# Patient Record
Sex: FEMALE | Race: WHITE | HISPANIC OR LATINO
[De-identification: names, ages, dates, MRNs, and addresses within clinical notes are randomized per-mention and may not be internally consistent; named-entity substitution may affect disease eponyms.]

---

## 2021-04-06 PROBLEM — Z00.00 ENCOUNTER FOR PREVENTIVE HEALTH EXAMINATION: Status: ACTIVE | Noted: 2021-04-06

## 2021-04-12 ENCOUNTER — TRANSCRIPTION ENCOUNTER (OUTPATIENT)
Age: 51
End: 2021-04-12

## 2021-04-12 ENCOUNTER — APPOINTMENT (OUTPATIENT)
Dept: COLORECTAL SURGERY | Facility: CLINIC | Age: 51
End: 2021-04-12
Payer: COMMERCIAL

## 2021-04-12 ENCOUNTER — INPATIENT (INPATIENT)
Facility: HOSPITAL | Age: 51
LOS: 1 days | Discharge: HOME | DRG: 355 | End: 2021-04-14
Attending: SURGERY | Admitting: SURGERY
Payer: COMMERCIAL

## 2021-04-12 VITALS
OXYGEN SATURATION: 100 % | WEIGHT: 162.92 LBS | SYSTOLIC BLOOD PRESSURE: 141 MMHG | TEMPERATURE: 98 F | DIASTOLIC BLOOD PRESSURE: 94 MMHG | RESPIRATION RATE: 18 BRPM | HEART RATE: 85 BPM | HEIGHT: 63 IN

## 2021-04-12 VITALS
WEIGHT: 163 LBS | BODY MASS INDEX: 28.88 KG/M2 | DIASTOLIC BLOOD PRESSURE: 84 MMHG | SYSTOLIC BLOOD PRESSURE: 133 MMHG | HEIGHT: 63 IN | HEART RATE: 79 BPM | TEMPERATURE: 98.2 F

## 2021-04-12 DIAGNOSIS — K42.0 UMBILICAL HERNIA WITH OBSTRUCTION, WITHOUT GANGRENE: ICD-10-CM

## 2021-04-12 DIAGNOSIS — Z80.0 FAMILY HISTORY OF MALIGNANT NEOPLASM OF DIGESTIVE ORGANS: ICD-10-CM

## 2021-04-12 DIAGNOSIS — R10.9 UNSPECIFIED ABDOMINAL PAIN: ICD-10-CM

## 2021-04-12 DIAGNOSIS — Z87.891 PERSONAL HISTORY OF NICOTINE DEPENDENCE: ICD-10-CM

## 2021-04-12 DIAGNOSIS — Z98.890 OTHER SPECIFIED POSTPROCEDURAL STATES: Chronic | ICD-10-CM

## 2021-04-12 DIAGNOSIS — Z82.49 FAMILY HISTORY OF ISCHEMIC HEART DISEASE AND OTHER DISEASES OF THE CIRCULATORY SYSTEM: ICD-10-CM

## 2021-04-12 DIAGNOSIS — N83.209 UNSPECIFIED OVARIAN CYST, UNSPECIFIED SIDE: ICD-10-CM

## 2021-04-12 DIAGNOSIS — Z72.89 OTHER PROBLEMS RELATED TO LIFESTYLE: ICD-10-CM

## 2021-04-12 DIAGNOSIS — Z80.3 FAMILY HISTORY OF MALIGNANT NEOPLASM OF BREAST: ICD-10-CM

## 2021-04-12 DIAGNOSIS — J45.909 UNSPECIFIED ASTHMA, UNCOMPLICATED: ICD-10-CM

## 2021-04-12 DIAGNOSIS — Z86.69 PERSONAL HISTORY OF OTHER DISEASES OF THE NERVOUS SYSTEM AND SENSE ORGANS: ICD-10-CM

## 2021-04-12 DIAGNOSIS — Z72.3 LACK OF PHYSICAL EXERCISE: ICD-10-CM

## 2021-04-12 DIAGNOSIS — S32.009A UNSPECIFIED FRACTURE OF UNSPECIFIED LUMBAR VERTEBRA, INITIAL ENCOUNTER FOR CLOSED FRACTURE: ICD-10-CM

## 2021-04-12 LAB
ALBUMIN SERPL ELPH-MCNC: 4 G/DL — SIGNIFICANT CHANGE UP (ref 3.3–5)
ALBUMIN SERPL ELPH-MCNC: 4.2 G/DL — SIGNIFICANT CHANGE UP (ref 3.3–5)
ALP SERPL-CCNC: 80 U/L — SIGNIFICANT CHANGE UP (ref 40–120)
ALP SERPL-CCNC: 82 U/L — SIGNIFICANT CHANGE UP (ref 40–120)
ALT FLD-CCNC: 12 U/L — SIGNIFICANT CHANGE UP (ref 10–45)
ALT FLD-CCNC: SIGNIFICANT CHANGE UP U/L (ref 10–45)
ANION GAP SERPL CALC-SCNC: 14 MMOL/L — SIGNIFICANT CHANGE UP (ref 5–17)
ANION GAP SERPL CALC-SCNC: 9 MMOL/L — SIGNIFICANT CHANGE UP (ref 5–17)
APPEARANCE UR: CLEAR — SIGNIFICANT CHANGE UP
APTT BLD: 33.3 SEC — SIGNIFICANT CHANGE UP (ref 27.5–35.5)
AST SERPL-CCNC: 20 U/L — SIGNIFICANT CHANGE UP (ref 10–40)
AST SERPL-CCNC: SIGNIFICANT CHANGE UP U/L (ref 10–40)
BASOPHILS # BLD AUTO: 0.08 K/UL — SIGNIFICANT CHANGE UP (ref 0–0.2)
BASOPHILS NFR BLD AUTO: 1.1 % — SIGNIFICANT CHANGE UP (ref 0–2)
BILIRUB SERPL-MCNC: 0.3 MG/DL — SIGNIFICANT CHANGE UP (ref 0.2–1.2)
BILIRUB SERPL-MCNC: 0.3 MG/DL — SIGNIFICANT CHANGE UP (ref 0.2–1.2)
BILIRUB UR-MCNC: NEGATIVE — SIGNIFICANT CHANGE UP
BLD GP AB SCN SERPL QL: NEGATIVE — SIGNIFICANT CHANGE UP
BUN SERPL-MCNC: 6 MG/DL — LOW (ref 7–23)
BUN SERPL-MCNC: 7 MG/DL — SIGNIFICANT CHANGE UP (ref 7–23)
CALCIUM SERPL-MCNC: 9.2 MG/DL — SIGNIFICANT CHANGE UP (ref 8.4–10.5)
CALCIUM SERPL-MCNC: 9.5 MG/DL — SIGNIFICANT CHANGE UP (ref 8.4–10.5)
CHLORIDE SERPL-SCNC: 105 MMOL/L — SIGNIFICANT CHANGE UP (ref 96–108)
CHLORIDE SERPL-SCNC: 111 MMOL/L — HIGH (ref 96–108)
CO2 SERPL-SCNC: 20 MMOL/L — LOW (ref 22–31)
CO2 SERPL-SCNC: 24 MMOL/L — SIGNIFICANT CHANGE UP (ref 22–31)
COLOR SPEC: YELLOW — SIGNIFICANT CHANGE UP
CREAT SERPL-MCNC: 0.68 MG/DL — SIGNIFICANT CHANGE UP (ref 0.5–1.3)
CREAT SERPL-MCNC: 0.7 MG/DL — SIGNIFICANT CHANGE UP (ref 0.5–1.3)
DIFF PNL FLD: NEGATIVE — SIGNIFICANT CHANGE UP
EOSINOPHIL # BLD AUTO: 0.06 K/UL — SIGNIFICANT CHANGE UP (ref 0–0.5)
EOSINOPHIL NFR BLD AUTO: 0.8 % — SIGNIFICANT CHANGE UP (ref 0–6)
GLUCOSE SERPL-MCNC: 87 MG/DL — SIGNIFICANT CHANGE UP (ref 70–99)
GLUCOSE SERPL-MCNC: 92 MG/DL — SIGNIFICANT CHANGE UP (ref 70–99)
GLUCOSE UR QL: NEGATIVE — SIGNIFICANT CHANGE UP
HCT VFR BLD CALC: 36.4 % — SIGNIFICANT CHANGE UP (ref 34.5–45)
HGB BLD-MCNC: 11.4 G/DL — LOW (ref 11.5–15.5)
IMM GRANULOCYTES NFR BLD AUTO: 0.3 % — SIGNIFICANT CHANGE UP (ref 0–1.5)
INR BLD: 1.04 — SIGNIFICANT CHANGE UP (ref 0.88–1.16)
KETONES UR-MCNC: NEGATIVE — SIGNIFICANT CHANGE UP
LEUKOCYTE ESTERASE UR-ACNC: NEGATIVE — SIGNIFICANT CHANGE UP
LIDOCAIN IGE QN: 19 U/L — SIGNIFICANT CHANGE UP (ref 7–60)
LYMPHOCYTES # BLD AUTO: 2.28 K/UL — SIGNIFICANT CHANGE UP (ref 1–3.3)
LYMPHOCYTES # BLD AUTO: 31.8 % — SIGNIFICANT CHANGE UP (ref 13–44)
MCHC RBC-ENTMCNC: 24.5 PG — LOW (ref 27–34)
MCHC RBC-ENTMCNC: 31.3 GM/DL — LOW (ref 32–36)
MCV RBC AUTO: 78.1 FL — LOW (ref 80–100)
MONOCYTES # BLD AUTO: 0.45 K/UL — SIGNIFICANT CHANGE UP (ref 0–0.9)
MONOCYTES NFR BLD AUTO: 6.3 % — SIGNIFICANT CHANGE UP (ref 2–14)
NEUTROPHILS # BLD AUTO: 4.27 K/UL — SIGNIFICANT CHANGE UP (ref 1.8–7.4)
NEUTROPHILS NFR BLD AUTO: 59.7 % — SIGNIFICANT CHANGE UP (ref 43–77)
NITRITE UR-MCNC: NEGATIVE — SIGNIFICANT CHANGE UP
NRBC # BLD: 0 /100 WBCS — SIGNIFICANT CHANGE UP (ref 0–0)
PH UR: 6.5 — SIGNIFICANT CHANGE UP (ref 5–8)
PLATELET # BLD AUTO: 328 K/UL — SIGNIFICANT CHANGE UP (ref 150–400)
POTASSIUM SERPL-MCNC: 3.7 MMOL/L — SIGNIFICANT CHANGE UP (ref 3.5–5.3)
POTASSIUM SERPL-MCNC: SIGNIFICANT CHANGE UP MMOL/L (ref 3.5–5.3)
POTASSIUM SERPL-SCNC: 3.7 MMOL/L — SIGNIFICANT CHANGE UP (ref 3.5–5.3)
POTASSIUM SERPL-SCNC: SIGNIFICANT CHANGE UP MMOL/L (ref 3.5–5.3)
PROT SERPL-MCNC: 7.4 G/DL — SIGNIFICANT CHANGE UP (ref 6–8.3)
PROT SERPL-MCNC: 7.7 G/DL — SIGNIFICANT CHANGE UP (ref 6–8.3)
PROT UR-MCNC: NEGATIVE MG/DL — SIGNIFICANT CHANGE UP
PROTHROM AB SERPL-ACNC: 12.5 SEC — SIGNIFICANT CHANGE UP (ref 10.6–13.6)
RBC # BLD: 4.66 M/UL — SIGNIFICANT CHANGE UP (ref 3.8–5.2)
RBC # FLD: 15.7 % — HIGH (ref 10.3–14.5)
RH IG SCN BLD-IMP: POSITIVE — SIGNIFICANT CHANGE UP
RH IG SCN BLD-IMP: POSITIVE — SIGNIFICANT CHANGE UP
SARS-COV-2 RNA SPEC QL NAA+PROBE: SIGNIFICANT CHANGE UP
SODIUM SERPL-SCNC: 139 MMOL/L — SIGNIFICANT CHANGE UP (ref 135–145)
SODIUM SERPL-SCNC: 144 MMOL/L — SIGNIFICANT CHANGE UP (ref 135–145)
SP GR SPEC: 1.01 — SIGNIFICANT CHANGE UP (ref 1–1.03)
UROBILINOGEN FLD QL: 0.2 E.U./DL — SIGNIFICANT CHANGE UP
WBC # BLD: 7.16 K/UL — SIGNIFICANT CHANGE UP (ref 3.8–10.5)
WBC # FLD AUTO: 7.16 K/UL — SIGNIFICANT CHANGE UP (ref 3.8–10.5)

## 2021-04-12 PROCEDURE — 93010 ELECTROCARDIOGRAM REPORT: CPT

## 2021-04-12 PROCEDURE — 99285 EMERGENCY DEPT VISIT HI MDM: CPT | Mod: 25

## 2021-04-12 PROCEDURE — 71046 X-RAY EXAM CHEST 2 VIEWS: CPT | Mod: 26

## 2021-04-12 PROCEDURE — 99072 ADDL SUPL MATRL&STAF TM PHE: CPT

## 2021-04-12 PROCEDURE — 74018 RADEX ABDOMEN 1 VIEW: CPT | Mod: 26

## 2021-04-12 PROCEDURE — 99204 OFFICE O/P NEW MOD 45 MIN: CPT

## 2021-04-12 RX ORDER — ACETAMINOPHEN 500 MG
650 TABLET ORAL EVERY 6 HOURS
Refills: 0 | Status: DISCONTINUED | OUTPATIENT
Start: 2021-04-12 | End: 2021-04-13

## 2021-04-12 RX ORDER — PANTOPRAZOLE SODIUM 20 MG/1
40 TABLET, DELAYED RELEASE ORAL DAILY
Refills: 0 | Status: DISCONTINUED | OUTPATIENT
Start: 2021-04-12 | End: 2021-04-13

## 2021-04-12 RX ORDER — HYOSCYAMINE SULFATE 0.13 MG
0.38 TABLET ORAL
Qty: 0 | Refills: 0 | DISCHARGE

## 2021-04-12 RX ORDER — HYDROMORPHONE HYDROCHLORIDE 2 MG/ML
0.5 INJECTION INTRAMUSCULAR; INTRAVENOUS; SUBCUTANEOUS EVERY 6 HOURS
Refills: 0 | Status: DISCONTINUED | OUTPATIENT
Start: 2021-04-12 | End: 2021-04-13

## 2021-04-12 RX ORDER — HEPARIN SODIUM 5000 [USP'U]/ML
5000 INJECTION INTRAVENOUS; SUBCUTANEOUS EVERY 8 HOURS
Refills: 0 | Status: DISCONTINUED | OUTPATIENT
Start: 2021-04-12 | End: 2021-04-13

## 2021-04-12 RX ORDER — MORPHINE SULFATE 50 MG/1
4 CAPSULE, EXTENDED RELEASE ORAL ONCE
Refills: 0 | Status: DISCONTINUED | OUTPATIENT
Start: 2021-04-12 | End: 2021-04-12

## 2021-04-12 RX ORDER — ONDANSETRON 8 MG/1
4 TABLET, FILM COATED ORAL ONCE
Refills: 0 | Status: COMPLETED | OUTPATIENT
Start: 2021-04-12 | End: 2021-04-12

## 2021-04-12 RX ORDER — SODIUM CHLORIDE 9 MG/ML
1000 INJECTION INTRAMUSCULAR; INTRAVENOUS; SUBCUTANEOUS ONCE
Refills: 0 | Status: COMPLETED | OUTPATIENT
Start: 2021-04-12 | End: 2021-04-12

## 2021-04-12 RX ORDER — PANTOPRAZOLE SODIUM 20 MG/1
1 TABLET, DELAYED RELEASE ORAL
Qty: 0 | Refills: 0 | DISCHARGE

## 2021-04-12 RX ORDER — ONDANSETRON 8 MG/1
4 TABLET, FILM COATED ORAL EVERY 6 HOURS
Refills: 0 | Status: DISCONTINUED | OUTPATIENT
Start: 2021-04-12 | End: 2021-04-13

## 2021-04-12 RX ORDER — SODIUM CHLORIDE 9 MG/ML
1000 INJECTION, SOLUTION INTRAVENOUS
Refills: 0 | Status: DISCONTINUED | OUTPATIENT
Start: 2021-04-12 | End: 2021-04-13

## 2021-04-12 RX ORDER — KETOROLAC TROMETHAMINE 30 MG/ML
15 SYRINGE (ML) INJECTION ONCE
Refills: 0 | Status: DISCONTINUED | OUTPATIENT
Start: 2021-04-12 | End: 2021-04-13

## 2021-04-12 RX ADMIN — SODIUM CHLORIDE 1000 MILLILITER(S): 9 INJECTION INTRAMUSCULAR; INTRAVENOUS; SUBCUTANEOUS at 13:23

## 2021-04-12 RX ADMIN — MORPHINE SULFATE 4 MILLIGRAM(S): 50 CAPSULE, EXTENDED RELEASE ORAL at 13:22

## 2021-04-12 RX ADMIN — SODIUM CHLORIDE 115 MILLILITER(S): 9 INJECTION, SOLUTION INTRAVENOUS at 20:12

## 2021-04-12 RX ADMIN — ONDANSETRON 4 MILLIGRAM(S): 8 TABLET, FILM COATED ORAL at 13:23

## 2021-04-12 RX ADMIN — HEPARIN SODIUM 5000 UNIT(S): 5000 INJECTION INTRAVENOUS; SUBCUTANEOUS at 21:38

## 2021-04-12 RX ADMIN — HYDROMORPHONE HYDROCHLORIDE 0.5 MILLIGRAM(S): 2 INJECTION INTRAMUSCULAR; INTRAVENOUS; SUBCUTANEOUS at 20:20

## 2021-04-12 RX ADMIN — PANTOPRAZOLE SODIUM 40 MILLIGRAM(S): 20 TABLET, DELAYED RELEASE ORAL at 16:28

## 2021-04-12 RX ADMIN — HYDROMORPHONE HYDROCHLORIDE 0.5 MILLIGRAM(S): 2 INJECTION INTRAMUSCULAR; INTRAVENOUS; SUBCUTANEOUS at 20:12

## 2021-04-12 RX ADMIN — SODIUM CHLORIDE 115 MILLILITER(S): 9 INJECTION, SOLUTION INTRAVENOUS at 16:01

## 2021-04-12 NOTE — H&P ADULT - HISTORY OF PRESENT ILLNESS
Patient is a 52 y/o F with PMH of Meniere's disease Patient is a 52 y/o F with PMH of Meniere's disease, PSH of breast reconstruction in 1986, laparoscopy in 1991 for possible ruptured ovarian cyst, that presented to Dr. Shah's office today for second opinion, for evaluation of abdominal pain that she describes as periumbilical, LLQ and is colicky and "twisting-like". 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 She had an EGD with biopsy of the cyst that was negative for malignancy, and per patient's wife (ED physician), the EGD showed a duodenal ulcer and completed therapy for H. pylori. The pain has persisted despite the above interventions.  She also reports a 20 lbs loss since the onset of symptoms, and only tolerates liquids. She last had a BM and gas today, and never had a colonoscopy.

## 2021-04-12 NOTE — ED PROVIDER NOTE - PHYSICAL EXAMINATION
Vitals reviewed  Gen: uncomfortable appearing but nad, speaking in full sentences  Skin: wwp, no rash/lesions  HEENT: ncat, eomi, mmm  CV: rrr, no audible m/r/g  Resp: symmetrical expansion, ctab, no w/r/r  Abd: nondistended, soft, + ttp over umbilicus and diffuse upper abd, voluntary guarding at umbilicus, no palpable mass, no rebound, no cvat   Ext: FROM throughout, no peripheral edema  Neuro: alert/oriented, no focal deficits, steady gait

## 2021-04-12 NOTE — HISTORY OF PRESENT ILLNESS
[FreeTextEntry1] : 52 y/o F presents with wife (ED physician) with severe abdominal pain for second opinion \par H/o Menieres disease, uses medical marijuana \par PSH of breast reconstruction 1986 and laparoscopy for a ruptured ovarian cyst ~ 1991\par \par c/o a constant pain near the umbilicus that radiates to different quadrants throughout the day. States that every 2 hours it feels like a twisting sensation near the umbilicus that then feels like its pulling down to the groin. Pain began in February of this year. 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 Denies fever but reports occasional chills when feeling nauseous \par \par Intake is limited to fluids and broth. Wife states she had pain when consuming solid food. Has lost ~ 20 lbs. since symptoms began in February\par \par CT A/P completed 2/20/21 at [facility]\par - cystic nodule projecting off the pancreatic body\par - punctate non-obstructing calculus within the left kidney\par - possible sludge within the gallbladder\par - lobular uterus\par \par MRCP completed 2/26/21 at [facility]\par - lobulated 3.1 x 2.5 cm complex cystic lesion at the level of the pancreatic body with a few thin septations. This may represent a mucinous cystic neoplasm, such as mucinous cystadenoma or side branch intraductal papillary mucinous neoplasm\par \par U/s RUQ completed 3/29/21 at [facility]\par - possible hepatic steatosis \par \par CT A/P repeated 3/29/21 at [facility]\par - mass is seen at the junction of the pancreatic body and tail. No evidence of peripancreatic inflammatory changes are seen at this time to suggest pancreatitis however further evaluation may include amylase levels\par - nonobstructing left nephrolithiasis\par - abnormal appearance of the vaginal canal which is fluid distended for which clinical correlation is advised. In addition, presumed fibroid uterus with cystic ovaries greater on the right noted. \par \par Transabdominal pelvic u/s completed 3/31/21\par - small uterine fibroids \par \par EGD completed with Dr. Salima Kuo 2/25/21 at [facility] \par - small hiatal hernia\par - mild antral gastritis\par - moderate duodenitis\par - diminutive bulb ulcer\par \par Patient was started on Levbid and Pantoprazole \par \par Upper EUS completed 3/22/21 with Dr. Ankit Gray at [facility] \par - gastritis\par - cystic lesion seen in pancreatic body, tissue obtained \par - hypercholic material consistent with sludge\par - full length septation vs. phrygian cap of gallbladder \par \par Pathology was consistent with with cyst content, negative for malignant cells \par \par BH: weekly. Typically 1-2 times daily \par C/o straining with hard stools\par Never had a colonoscopy \par FMH of stomach and breast cancer in mother, diagnosed at age 51

## 2021-04-12 NOTE — ED ADULT TRIAGE NOTE - CHIEF COMPLAINT QUOTE
Pt c/o abdominal pain around belly button for several weeks. Pt saw Dr. Bach this AM and was told to come to ER for admission and surgery tomorrow. Pt unsure of diagnoses, pt knows she was told she has a cyst on her pancreas. Sent to ED for Pre-op Ex lap. +Nausea and vomiting. Denies any urinary symptoms.

## 2021-04-12 NOTE — H&P ADULT - NSHPLABSRESULTS_GEN_ALL_CORE
11.4   7.16  )-----------( 328      ( 12 Apr 2021 13:34 )             36.4   04-12    144  |  111<H>  |  6<L>  ----------------------------<  87  3.7   |  24  |  0.70    Ca    9.2      12 Apr 2021 15:03    TPro  7.4  /  Alb  4.0  /  TBili  0.3  /  DBili  x   /  AST  20  /  ALT  12  /  AlkPhos  80  04-12    EXAM: XR KUB 1 VIEW    PROCEDURE DATE: 04/12/2021        INTERPRETATION: Clinical history/reason for exam: Pain.    Supine view.    Findings/  impression: No abnormal bowel dilatation or pneumoperitoneum. Lower lumbar spine degenerative changes. Left renal stone. Clear lung bases.        Thank you for the opportunity to participate in the care of this patient.

## 2021-04-12 NOTE — H&P ADULT - NSHPPHYSICALEXAM_GEN_ALL_CORE
General: NAD, lying in bed  HEENT: NC/AT, EOMI, MMM  Resp: Non-labored breathing on RA  CV: regular rhythm, palpable distal pulses  Abdomen: soft, non-distended, TTP periumbilical, RLQ, LLQ, no rebound, patient reflects to opposite site on percussion  Extremities: warm, non-edematous

## 2021-04-12 NOTE — H&P ADULT - ASSESSMENT
Patient is a 52 y/o F with PMH of Meniere's disease, PSH of breast reconstruction in 1986, laparoscopy in 1991 for possible ruptured ovarian cyst, that presented to Dr. Shah's office today for second opinion, for evaluation of abdominal pain that she describes as periumbilical, LLQ and is colicky and "twisting-like". 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 She had an EGD with biopsy of the cyst that was negative for malignancy, and per patient's wife (ED physician), the EGD showed a duodenal ulcer and completed therapy for H. pylori. The pain has persisted despite the above interventions.  She also reports a 20 lbs loss since the onset of symptoms, and only tolerates liquids. She last had a BM and gas today, and never had a colonoscopy. She was sent to ED for admission and diagnostic laparoscopy tomorrow. She is afebrile and HDS, with non-peritonitic diffuse abdominal pain on exam.     - Admit to Regional  - CLD and NPO@ [facility]  - Patient booked for diagnostic laparoscopy tomorrow  - Hold DVT ppx at [facility] Patient is a 50 y/o F with PMH of Meniere's disease, PSH of breast reconstruction in 1986, laparoscopy in 1991 for possible ruptured ovarian cyst, that presented to Dr. Shah's office today for second opinion, for evaluation of abdominal pain that she describes as periumbilical, LLQ and is colicky and "twisting-like". 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 She had an EGD with biopsy of the cyst that was negative for malignancy, and per patient's wife (ED physician), the EGD showed a duodenal ulcer and completed therapy for H. pylori. The pain has persisted despite the above interventions.  She also reports a 20 lbs loss since the onset of symptoms, and only tolerates liquids. She last had a BM and gas today, and never had a colonoscopy. She was sent to ED for admission and diagnostic laparoscopy tomorrow. She is afebrile and HDS, with non-peritonitic diffuse abdominal pain on exam.     - Admit to Regional  - CLD and NPO@ [facility]  - Patient booked for diagnostic laparoscopy tomorrow  - SQH/SCD/AMB/OOB

## 2021-04-12 NOTE — PHYSICAL EXAM
[Abdomen Tenderness] : ~T ~M Abdominal tenderness [No HSM] : no hepatosplenomegaly [No Rash or Lesion] : No rash or lesion [Alert] : alert [Anxious] : anxious [Abdomen Masses] : No abdominal masses [de-identified] : Moderate periumbilical tenderness- focal guarding.

## 2021-04-12 NOTE — ED PROVIDER NOTE - ATTENDING CONTRIBUTION TO CARE
52 yo F with PSH of breast reconstruction 1986 and laparoscopy for a ruptured ovarian cyst ~ 1991, Meniere's disease, on medical thc referred to ED by Dr. Shah, c/o abd pain x 3mons, worse the past month w/ NV. Pt reports constant dull pain at umbilicus w/ episodes of sharp internal twisting/pulling pain, worsening over past month w/ anorexia, NV; reports approx 20lb wt loss. Has had multiple outpt imaging studies/EGD and recently admitted to [facility], told CT shows pancreatic mass, report benign biopsy. All previous studies were given to Dr. Shah as per partner. Denies f/c, headache, dizziness, fainting, chest pain, sob, cough, uri sxs, diarrhea, urinary sxs, trauma. Pt appears uncomfortable, but nad, Abd: nondistended, soft, + ttp over umbilicus and diffuse upper abd, voluntary guarding at umbilicus, no palpable mass, no rebound, no cvat. Pt given IVF/zofran/morphine and KUB obtained as per surgery request. Labs noted. Pt admitted to Surgery,

## 2021-04-12 NOTE — ED PROVIDER NOTE - CLINICAL SUMMARY MEDICAL DECISION MAKING FREE TEXT BOX
51 F psh PSH of breast reconstruction 1986 and laparoscopy for a ruptured ovarian cyst ~ 1991, menieres disease, on medical thc referred to ED by Dr. Shah, c/o abd pain x 3mons, worse the past month w/ NV.  on exam pt appears uncomfortable but nad, Abd: nondistended, soft, + ttp over umbilicus and diffuse upper abd, voluntary guarding at umbilicus, no palpable mass, no rebound, no cvat.  ? hernia without palpable mass, colitis, pancreatitis.  will obtain preop labs/cxr, give IVF/zofran/morphine and d/w surgery

## 2021-04-12 NOTE — ED PROVIDER NOTE - PROGRESS NOTE DETAILS
cmp hemolyzed, rpt sent. otherwise labs wnl.  KUB obtained as per surgery request.  for surgery regional admission

## 2021-04-12 NOTE — ASSESSMENT
[FreeTextEntry1] : I reviewed with the patient and her wife the findings of her history and imaging consistent with abdominal pain of unclear etiology. I have suggested that the options would be continued further evaluation and testing versus diagnostic laparoscopy. They are either to pursue a diagnostic laparoscopy. I have discussed with him the role of diagnostic laparoscopy and limitations. The potential etiology of her pain may include secondary adhesions versus malignancy/occult versus secondary etiologies of unclear nature. However, I have discussed with them the possibility of a negative laparoscopy and continued need for further evaluation treatment . The risks, benefits alternatives and the treatment plan have been outlined in reviewed. All questions answered.\par \par The patient will go to the emergency room at this time for direct admission and surgery will be scheduled in 24 hours.\par \par I reviewed the images and are suspicious of a possible incarcerated omental umbilical hernia. I discussed the general surgery/Dr. Israel possible role of resection. We will await findings on laparoscopy before proceeding with possible umbilical hernia repair.

## 2021-04-12 NOTE — ED ADULT NURSE REASSESSMENT NOTE - NS ED NURSE REASSESS COMMENT FT1
pt in no acute distress. family at bedside, safety maintained. pt pending results & bed on floor. will continue to monitor.

## 2021-04-12 NOTE — ED PROVIDER NOTE - OBJECTIVE STATEMENT
51 F psh PSH of breast reconstruction 1986 and laparoscopy for a ruptured ovarian cyst ~ 1991, menieres disease, on medical thc referred to ED by Dr. Shah, c/o abd pain x 3mons, worse the past month w/ NV.  Reports constant dull pain at umbilicus w/ episodes of sharp internal twisting/pulling pain.  Pain worse over past month w/ anorexia, NV; reports approx 20lb wt loss.  Has had multiple outpt imaging studies and recently admitted to [facility], told CT shows pancreatic mass 51 F psh PSH of breast reconstruction 1986 and laparoscopy for a ruptured ovarian cyst ~ 1991, menieres disease, on medical thc referred to ED by Dr. Shah, c/o abd pain x 3mons, worse the past month w/ NV.  Reports constant dull pain at umbilicus w/ episodes of sharp internal twisting/pulling pain.  Pain worse over past month w/ anorexia, NV; reports approx 20lb wt loss.  Has had multiple outpt imaging studies/EGD and recently admitted to [facility], told CT shows pancreatic mass, report benign biopsy.  All previous studies were given to Dr. Shah as per partner.  Denies f/c, headache, dizziness, fainting, chest pain, sob, cough, uri sxs, diarrhea, urinary sxs, trauma.

## 2021-04-13 ENCOUNTER — APPOINTMENT (OUTPATIENT)
Dept: COLORECTAL SURGERY | Facility: HOSPITAL | Age: 51
End: 2021-04-13

## 2021-04-13 LAB
ANION GAP SERPL CALC-SCNC: 10 MMOL/L — SIGNIFICANT CHANGE UP (ref 5–17)
BUN SERPL-MCNC: 4 MG/DL — LOW (ref 7–23)
CALCIUM SERPL-MCNC: 9.6 MG/DL — SIGNIFICANT CHANGE UP (ref 8.4–10.5)
CHLORIDE SERPL-SCNC: 106 MMOL/L — SIGNIFICANT CHANGE UP (ref 96–108)
CO2 SERPL-SCNC: 25 MMOL/L — SIGNIFICANT CHANGE UP (ref 22–31)
COVID-19 SPIKE DOMAIN AB INTERP: POSITIVE
COVID-19 SPIKE DOMAIN ANTIBODY RESULT: >250 U/ML — HIGH
CREAT SERPL-MCNC: 0.74 MG/DL — SIGNIFICANT CHANGE UP (ref 0.5–1.3)
GLUCOSE SERPL-MCNC: 98 MG/DL — SIGNIFICANT CHANGE UP (ref 70–99)
HCG UR QL: NEGATIVE — SIGNIFICANT CHANGE UP
HCT VFR BLD CALC: 40.1 % — SIGNIFICANT CHANGE UP (ref 34.5–45)
HGB BLD-MCNC: 12.1 G/DL — SIGNIFICANT CHANGE UP (ref 11.5–15.5)
MAGNESIUM SERPL-MCNC: 1.7 MG/DL — SIGNIFICANT CHANGE UP (ref 1.6–2.6)
MCHC RBC-ENTMCNC: 24.2 PG — LOW (ref 27–34)
MCHC RBC-ENTMCNC: 30.2 GM/DL — LOW (ref 32–36)
MCV RBC AUTO: 80 FL — SIGNIFICANT CHANGE UP (ref 80–100)
NRBC # BLD: 0 /100 WBCS — SIGNIFICANT CHANGE UP (ref 0–0)
PHOSPHATE SERPL-MCNC: 2.9 MG/DL — SIGNIFICANT CHANGE UP (ref 2.5–4.5)
PLATELET # BLD AUTO: 328 K/UL — SIGNIFICANT CHANGE UP (ref 150–400)
POTASSIUM SERPL-MCNC: 3.9 MMOL/L — SIGNIFICANT CHANGE UP (ref 3.5–5.3)
POTASSIUM SERPL-SCNC: 3.9 MMOL/L — SIGNIFICANT CHANGE UP (ref 3.5–5.3)
RBC # BLD: 5.01 M/UL — SIGNIFICANT CHANGE UP (ref 3.8–5.2)
RBC # FLD: 15.4 % — HIGH (ref 10.3–14.5)
SARS-COV-2 IGG+IGM SERPL QL IA: >250 U/ML — HIGH
SARS-COV-2 IGG+IGM SERPL QL IA: POSITIVE
SODIUM SERPL-SCNC: 141 MMOL/L — SIGNIFICANT CHANGE UP (ref 135–145)
WBC # BLD: 6.9 K/UL — SIGNIFICANT CHANGE UP (ref 3.8–10.5)
WBC # FLD AUTO: 6.9 K/UL — SIGNIFICANT CHANGE UP (ref 3.8–10.5)

## 2021-04-13 PROCEDURE — 49587: CPT | Mod: GC

## 2021-04-13 PROCEDURE — 49320 DIAG LAPARO SEPARATE PROC: CPT | Mod: GC

## 2021-04-13 RX ORDER — SODIUM CHLORIDE 9 MG/ML
1000 INJECTION, SOLUTION INTRAVENOUS
Refills: 0 | Status: DISCONTINUED | OUTPATIENT
Start: 2021-04-13 | End: 2021-04-14

## 2021-04-13 RX ORDER — ONDANSETRON 8 MG/1
4 TABLET, FILM COATED ORAL EVERY 6 HOURS
Refills: 0 | Status: DISCONTINUED | OUTPATIENT
Start: 2021-04-13 | End: 2021-04-14

## 2021-04-13 RX ORDER — ACETAMINOPHEN 500 MG
1000 TABLET ORAL EVERY 6 HOURS
Refills: 0 | Status: DISCONTINUED | OUTPATIENT
Start: 2021-04-13 | End: 2021-04-14

## 2021-04-13 RX ORDER — HEPARIN SODIUM 5000 [USP'U]/ML
5000 INJECTION INTRAVENOUS; SUBCUTANEOUS EVERY 8 HOURS
Refills: 0 | Status: DISCONTINUED | OUTPATIENT
Start: 2021-04-13 | End: 2021-04-14

## 2021-04-13 RX ORDER — MAGNESIUM SULFATE 500 MG/ML
2 VIAL (ML) INJECTION ONCE
Refills: 0 | Status: COMPLETED | OUTPATIENT
Start: 2021-04-13 | End: 2021-04-13

## 2021-04-13 RX ORDER — KETOROLAC TROMETHAMINE 30 MG/ML
15 SYRINGE (ML) INJECTION EVERY 6 HOURS
Refills: 0 | Status: DISCONTINUED | OUTPATIENT
Start: 2021-04-13 | End: 2021-04-14

## 2021-04-13 RX ORDER — PANTOPRAZOLE SODIUM 20 MG/1
40 TABLET, DELAYED RELEASE ORAL DAILY
Refills: 0 | Status: DISCONTINUED | OUTPATIENT
Start: 2021-04-13 | End: 2021-04-14

## 2021-04-13 RX ORDER — HYDROMORPHONE HYDROCHLORIDE 2 MG/ML
0.5 INJECTION INTRAMUSCULAR; INTRAVENOUS; SUBCUTANEOUS EVERY 6 HOURS
Refills: 0 | Status: DISCONTINUED | OUTPATIENT
Start: 2021-04-13 | End: 2021-04-14

## 2021-04-13 RX ORDER — HYDROMORPHONE HYDROCHLORIDE 2 MG/ML
0.5 INJECTION INTRAMUSCULAR; INTRAVENOUS; SUBCUTANEOUS
Refills: 0 | Status: DISCONTINUED | OUTPATIENT
Start: 2021-04-13 | End: 2021-04-14

## 2021-04-13 RX ORDER — BUPIVACAINE 13.3 MG/ML
20 INJECTION, SUSPENSION, LIPOSOMAL INFILTRATION ONCE
Refills: 0 | Status: DISCONTINUED | OUTPATIENT
Start: 2021-04-13 | End: 2021-04-14

## 2021-04-13 RX ADMIN — HYDROMORPHONE HYDROCHLORIDE 0.5 MILLIGRAM(S): 2 INJECTION INTRAMUSCULAR; INTRAVENOUS; SUBCUTANEOUS at 02:19

## 2021-04-13 RX ADMIN — HYDROMORPHONE HYDROCHLORIDE 0.5 MILLIGRAM(S): 2 INJECTION INTRAMUSCULAR; INTRAVENOUS; SUBCUTANEOUS at 23:15

## 2021-04-13 RX ADMIN — Medication 15 MILLIGRAM(S): at 17:42

## 2021-04-13 RX ADMIN — HEPARIN SODIUM 5000 UNIT(S): 5000 INJECTION INTRAVENOUS; SUBCUTANEOUS at 17:42

## 2021-04-13 RX ADMIN — PANTOPRAZOLE SODIUM 40 MILLIGRAM(S): 20 TABLET, DELAYED RELEASE ORAL at 14:58

## 2021-04-13 RX ADMIN — Medication 1000 MILLIGRAM(S): at 17:42

## 2021-04-13 RX ADMIN — SODIUM CHLORIDE 40 MILLILITER(S): 9 INJECTION, SOLUTION INTRAVENOUS at 15:05

## 2021-04-13 RX ADMIN — HYDROMORPHONE HYDROCHLORIDE 0.5 MILLIGRAM(S): 2 INJECTION INTRAMUSCULAR; INTRAVENOUS; SUBCUTANEOUS at 02:06

## 2021-04-13 RX ADMIN — HYDROMORPHONE HYDROCHLORIDE 0.5 MILLIGRAM(S): 2 INJECTION INTRAMUSCULAR; INTRAVENOUS; SUBCUTANEOUS at 23:04

## 2021-04-13 RX ADMIN — Medication 15 MILLIGRAM(S): at 23:15

## 2021-04-13 RX ADMIN — ONDANSETRON 4 MILLIGRAM(S): 8 TABLET, FILM COATED ORAL at 04:13

## 2021-04-13 RX ADMIN — HEPARIN SODIUM 5000 UNIT(S): 5000 INJECTION INTRAVENOUS; SUBCUTANEOUS at 06:02

## 2021-04-13 RX ADMIN — Medication 15 MILLIGRAM(S): at 23:04

## 2021-04-13 RX ADMIN — HYDROMORPHONE HYDROCHLORIDE 0.5 MILLIGRAM(S): 2 INJECTION INTRAMUSCULAR; INTRAVENOUS; SUBCUTANEOUS at 15:20

## 2021-04-13 RX ADMIN — Medication 50 GRAM(S): at 10:53

## 2021-04-13 NOTE — BRIEF OPERATIVE NOTE - OPERATION/FINDINGS
Tap block w/ Exparel performed. Inspection of abdomen and pelvis revealed fibroid uterus & endometrial implant on right ovary and stippling along left adnexa. Entire length of small bowel run from ICV to LOT w/o any abnormalities. No internal hernias. Normal appendix. Normal gallbladder. Umbilical hernia dissected free. Defect closed w/ interrupted Ethibond sutures via Bangura technique. Skin closed w/ 3-0 Vicryl & 4-0 Monocryl sutures.

## 2021-04-13 NOTE — BRIEF OPERATIVE NOTE - NSICDXBRIEFPOSTOP_GEN_ALL_CORE_FT
POST-OP DIAGNOSIS:  Abdominal pain 13-Apr-2021 13:46:02  Julienne Mathew  Umbilical hernia 13-Apr-2021 13:46:10  Julienne Mathew

## 2021-04-13 NOTE — BRIEF OPERATIVE NOTE - NSICDXBRIEFPREOP_GEN_ALL_CORE_FT
PRE-OP DIAGNOSIS:  Abdominal pain 13-Apr-2021 13:45:32  Julienne Mathew  Umbilical hernia 13-Apr-2021 13:45:42  Julienne Mathew

## 2021-04-13 NOTE — BRIEF OPERATIVE NOTE - NSICDXBRIEFPROCEDURE_GEN_ALL_CORE_FT
PROCEDURES:  Diagnostic laparoscopy 13-Apr-2021 13:43:48  Julienne Mathew  Open repair of umbilical hernia in adult 13-Apr-2021 13:44:37  Julienne Mathew

## 2021-04-14 ENCOUNTER — TRANSCRIPTION ENCOUNTER (OUTPATIENT)
Age: 51
End: 2021-04-14

## 2021-04-14 VITALS
SYSTOLIC BLOOD PRESSURE: 158 MMHG | DIASTOLIC BLOOD PRESSURE: 90 MMHG | RESPIRATION RATE: 18 BRPM | TEMPERATURE: 98 F | OXYGEN SATURATION: 97 % | HEART RATE: 70 BPM

## 2021-04-14 LAB
-  AMPICILLIN: SIGNIFICANT CHANGE UP
-  CLINDAMYCIN: SIGNIFICANT CHANGE UP
-  ERYTHROMYCIN: SIGNIFICANT CHANGE UP
-  LEVOFLOXACIN: SIGNIFICANT CHANGE UP
-  PENICILLIN: SIGNIFICANT CHANGE UP
-  VANCOMYCIN: SIGNIFICANT CHANGE UP
CULTURE RESULTS: SIGNIFICANT CHANGE UP
METHOD TYPE: SIGNIFICANT CHANGE UP
ORGANISM # SPEC MICROSCOPIC CNT: SIGNIFICANT CHANGE UP
ORGANISM # SPEC MICROSCOPIC CNT: SIGNIFICANT CHANGE UP
SPECIMEN SOURCE: SIGNIFICANT CHANGE UP

## 2021-04-14 PROCEDURE — U0003: CPT

## 2021-04-14 PROCEDURE — 80053 COMPREHEN METABOLIC PANEL: CPT

## 2021-04-14 PROCEDURE — 81025 URINE PREGNANCY TEST: CPT

## 2021-04-14 PROCEDURE — 86900 BLOOD TYPING SEROLOGIC ABO: CPT

## 2021-04-14 PROCEDURE — 86901 BLOOD TYPING SEROLOGIC RH(D): CPT

## 2021-04-14 PROCEDURE — 85027 COMPLETE CBC AUTOMATED: CPT

## 2021-04-14 PROCEDURE — 83735 ASSAY OF MAGNESIUM: CPT

## 2021-04-14 PROCEDURE — 36415 COLL VENOUS BLD VENIPUNCTURE: CPT

## 2021-04-14 PROCEDURE — 87086 URINE CULTURE/COLONY COUNT: CPT

## 2021-04-14 PROCEDURE — 81003 URINALYSIS AUTO W/O SCOPE: CPT

## 2021-04-14 PROCEDURE — 83690 ASSAY OF LIPASE: CPT

## 2021-04-14 PROCEDURE — 84100 ASSAY OF PHOSPHORUS: CPT

## 2021-04-14 PROCEDURE — 96374 THER/PROPH/DIAG INJ IV PUSH: CPT

## 2021-04-14 PROCEDURE — 71046 X-RAY EXAM CHEST 2 VIEWS: CPT

## 2021-04-14 PROCEDURE — 85025 COMPLETE CBC W/AUTO DIFF WBC: CPT

## 2021-04-14 PROCEDURE — 87184 SC STD DISK METHOD PER PLATE: CPT

## 2021-04-14 PROCEDURE — U0005: CPT

## 2021-04-14 PROCEDURE — 85730 THROMBOPLASTIN TIME PARTIAL: CPT

## 2021-04-14 PROCEDURE — 85610 PROTHROMBIN TIME: CPT

## 2021-04-14 PROCEDURE — 86850 RBC ANTIBODY SCREEN: CPT

## 2021-04-14 PROCEDURE — 99285 EMERGENCY DEPT VISIT HI MDM: CPT | Mod: 25

## 2021-04-14 PROCEDURE — 80048 BASIC METABOLIC PNL TOTAL CA: CPT

## 2021-04-14 PROCEDURE — 74018 RADEX ABDOMEN 1 VIEW: CPT

## 2021-04-14 PROCEDURE — 86769 SARS-COV-2 COVID-19 ANTIBODY: CPT

## 2021-04-14 RX ADMIN — HYDROMORPHONE HYDROCHLORIDE 0.5 MILLIGRAM(S): 2 INJECTION INTRAMUSCULAR; INTRAVENOUS; SUBCUTANEOUS at 13:39

## 2021-04-14 RX ADMIN — HYDROMORPHONE HYDROCHLORIDE 0.5 MILLIGRAM(S): 2 INJECTION INTRAMUSCULAR; INTRAVENOUS; SUBCUTANEOUS at 14:05

## 2021-04-14 RX ADMIN — Medication 15 MILLIGRAM(S): at 05:06

## 2021-04-14 RX ADMIN — HEPARIN SODIUM 5000 UNIT(S): 5000 INJECTION INTRAVENOUS; SUBCUTANEOUS at 10:40

## 2021-04-14 RX ADMIN — Medication 15 MILLIGRAM(S): at 05:30

## 2021-04-14 RX ADMIN — Medication 1000 MILLIGRAM(S): at 05:30

## 2021-04-14 RX ADMIN — Medication 1000 MILLIGRAM(S): at 11:46

## 2021-04-14 RX ADMIN — Medication 1000 MILLIGRAM(S): at 05:06

## 2021-04-14 RX ADMIN — Medication 1000 MILLIGRAM(S): at 11:44

## 2021-04-14 RX ADMIN — HEPARIN SODIUM 5000 UNIT(S): 5000 INJECTION INTRAVENOUS; SUBCUTANEOUS at 05:06

## 2021-04-14 NOTE — DISCHARGE NOTE PROVIDER - NSDCACTIVITY_GEN_ALL_CORE
Return to Work/School allowed/Sex allowed/Showering allowed/Stairs allowed/Walking - Indoors allowed/No heavy lifting/straining/Walking - Outdoors allowed

## 2021-04-14 NOTE — DISCHARGE NOTE NURSING/CASE MANAGEMENT/SOCIAL WORK - PATIENT PORTAL LINK FT
You can access the FollowMyHealth Patient Portal offered by [facility] by registering at the following website: [email or web address]. By joining [facility]’s FollowMyHealth portal, you will also be able to view your health information using other applications (apps) compatible with our system.

## 2021-04-14 NOTE — DISCHARGE NOTE PROVIDER - CARE PROVIDER_API CALL
Jose Shah)  ColonRectal Surgery; Surgery  [address], [address] [place] [address]  Phone: [phone]  Fax: [phone]  Follow Up Time:     Rohit Israel)  Surgery  [address], [place] [address]  Phone: [phone]  Fax: [phone]  Follow Up Time:

## 2021-04-14 NOTE — DISCHARGE NOTE PROVIDER - NSDCCPCAREPLAN_GEN_ALL_CORE_FT
PRINCIPAL DISCHARGE DIAGNOSIS  Diagnosis: Abdominal pain  Assessment and Plan of Treatment: Follow up with Dr. Shah in 4 weeks and Dr Israel in 6 weeks, Call the office at the number below to schedule your appointment. You may shower; soap and water over incision sites. Do not scrub. Pat dry when done. No tub bathing or swimming until cleared. Keep incision sites out of the sun as scars will darken. Ambulate as tolerated, but no heavy lifting (>10lbs) or strenuous exercise. You may resume regular diet. You should be urinating at least 3-4x per day. Call the office if you experience increasing abdominal pain, nausea, vomiting, or temperature >101 F.

## 2021-04-14 NOTE — DISCHARGE NOTE PROVIDER - NSDCMRMEDTOKEN_GEN_ALL_CORE_FT
hyoscyamine 0.375 mg oral capsule, extended release: 0.375 cap(s) orally 2 times a day  oxycodone-acetaminophen 5 mg-325 mg oral tablet: 1 tab(s) orally every 6 hours, As Needed -for severe pain MDD:4  Protonix 40 mg oral delayed release tablet: 1 tab(s) orally once a day

## 2021-04-14 NOTE — DISCHARGE NOTE PROVIDER - HOSPITAL COURSE
Patient is a 52 y/o F with PMH of Meniere's disease, PSH of breast reconstruction in 1986, laparoscopy in 1991 for possible ruptured ovarian cyst, that presented to Dr. Shah's office 4/12 for second opinion, for evaluation of abdominal pain that she described as periumbilical, LLQ and is colicky and "twisting-like". Patient stated the pain started on February, and she has had a CT scan, and MRCP that showed a pancreatic cyst. She had an EGD with biopsy of the cyst that was negative for malignancy, and per patient's wife (ED physician), the EGD showed a duodenal ulcer and completed therapy for H. pylori. The pain has persisted despite the above interventions.  On  4/13, pt was taken to the OR and underwent diag laparoscopy with umbilical hernia repair. Procedure was uncomplicated. Post op course was uneventful. Pt tolrated PO intake, voided adequately and remained hemodynamically stable. At time of discharge pt was stable.

## 2021-04-14 NOTE — DISCHARGE NOTE PROVIDER - CARE PROVIDERS DIRECT ADDRESSES
[email or web address]

## 2021-04-14 NOTE — PROGRESS NOTE ADULT - SUBJECTIVE AND OBJECTIVE BOX
SUBJECTIVE: Pt seen and examined at bedside with chief. Pt admits to periumbilical pain and left sided abd pain controlled with Pain meds. Has been NPO since MN.    MEDICATIONS  (STANDING):  heparin   Injectable 5000 Unit(s) SubCutaneous every 8 hours  lactated ringers. 1000 milliLiter(s) (115 mL/Hr) IV Continuous <Continuous>  pantoprazole  Injectable 40 milliGRAM(s) IV Push daily    MEDICATIONS  (PRN):  acetaminophen   Tablet .. 650 milliGRAM(s) Oral every 6 hours PRN Moderate Pain (4 - 6)  HYDROmorphone  Injectable 0.5 milliGRAM(s) IV Push every 6 hours PRN Severe Pain (7 - 10)  ketorolac   Injectable 15 milliGRAM(s) IV Push once PRN Moderate Pain (4 - 6)  ondansetron Injectable 4 milliGRAM(s) IV Push every 6 hours PRN Nausea      Vital Signs Last 24 Hrs  T(C): 36.8 (2021 07:54), Max: 36.9 (2021 20:01)  T(F): 98.3 (2021 07:54), Max: 98.5 (2021 20:01)  HR: 57 (2021 07:54) (54 - 85)  BP: 141/93 (2021 07:54) (131/78 - 149/89)  BP(mean): --  RR: 17 (2021 07:54) (15 - 20)  SpO2: 99% (2021 07:54) (95% - 100%)    PHYSICAL EXAM:      Constitutional: A&Ox3    Respiratory: non labored breathing, no respiratory distress    Cardiovascular: NSR, RRR    Gastrointestinal: soft, ND, ttp in periumbilical region and left lower quadrant No rebound or guarding     Genitourinary: voiding     Extremities: (-) edema                  I&O's Detail    2021 07:01  -  2021 07:00  --------------------------------------------------------  IN:    Lactated Ringers: 1380 mL  Total IN: 1380 mL    OUT:    Oral Fluid: 0 mL    Voided (mL): 1400 mL  Total OUT: 1400 mL    Total NET: -20 mL      2021 07:01  -  2021 09:02  --------------------------------------------------------  IN:    Lactated Ringers: 230 mL  Total IN: 230 mL    OUT:    Voided (mL): 250 mL  Total OUT: 250 mL    Total NET: -20 mL          LABS:                        12.1   6.90  )-----------( 328      ( 2021 07:22 )             40.1     04-13    141  |  106  |  4<L>  ----------------------------<  98  3.9   |  25  |  0.74    Ca    9.6      2021 07:22  Phos  2.9     -13  Mg     1.7         TPro  7.4  /  Alb  4.0  /  TBili  0.3  /  DBili  x   /  AST  20  /  ALT  12  /  AlkPhos  80  04-12    PT/INR - ( 2021 13:34 )   PT: 12.5 sec;   INR: 1.04          PTT - ( 2021 13:34 )  PTT:33.3 sec  Urinalysis Basic - ( 2021 15:05 )    Color: Yellow / Appearance: Clear / S.010 / pH: x  Gluc: x / Ketone: NEGATIVE  / Bili: Negative / Urobili: 0.2 E.U./dL   Blood: x / Protein: NEGATIVE mg/dL / Nitrite: NEGATIVE   Leuk Esterase: NEGATIVE / RBC: x / WBC x   Sq Epi: x / Non Sq Epi: x / Bacteria: x        RADIOLOGY & ADDITIONAL STUDIES:
POST-OP CHECK @1600    Procedure: Diagnostic laparoscopy, umbilical hernia repair  Surgeon: Dr. Shah     S: Pt has no complaints. Endorses decreased pain of the abdomen since preoperative pain. [place] CLD without N/V, and advanced to soft for dinner. Voiding+. Denies CP, SOB, calf tenderness. Pain controlled with medication.    O:  T(C): 36 (04-13-21 @ 13:28), Max: 36 (04-13-21 @ 13:28)  T(F): 96.8 (04-13-21 @ 13:28), Max: 96.8 (04-13-21 @ 13:28)  HR: 77 (04-13-21 @ 15:31) (67 - 77)  BP: 157/82 (04-13-21 @ 15:31) (136/70 - 169/81)  RR: 21 (04-13-21 @ 15:31) (14 - 24)  SpO2: 94% (04-13-21 @ 15:31) (93% - 96%)                          12.1   6.90  )-----------( 328      ( 13 Apr 2021 07:22 )             40.1     04-13    141  |  106  |  4<L>  ----------------------------<  98  3.9   |  25  |  0.74    Ca    9.6      13 Apr 2021 07:22  Phos  2.9     04-13  Mg     1.7     04-13    TPro  7.4  /  Alb  4.0  /  TBili  0.3  /  DBili  x   /  AST  20  /  ALT  12  /  AlkPhos  80  04-12      Gen: NAD, resting comfortably in bed  Pulm: Nonlabored breathing, no respiratory distress on RA   Abd: soft, ND, appropriately TTP most significant R periumbilical, incisions CDI  Extrem: WWP, no calf edema, SCDs in place     A/P: 51yFemale s/p diagnostic laparoscopy, umbilical hernia repair    Soft/IVF  PPI   Pain/nausea control PRN   Strict I&Os  IS/OOBA/SCDs/SQH   AM Labs     Plan discussed with attending and chief resident.   ____________________________________________________  Dena Sparks MD     PGY1 - Surgery 
SUBJECTIVE: Pt seen and examined at bedside with chief. Pt denies any complaints. Pain well controlled. Tolerating diet without N/V. Admits to flatus       MEDICATIONS  (STANDING):  acetaminophen   Tablet .. 1000 milliGRAM(s) Oral every 6 hours  BUpivacaine liposome 1.3% Injectable (no eMAR) 20 milliLiter(s) Local Injection once  heparin   Injectable 5000 Unit(s) SubCutaneous every 8 hours  ketorolac   Injectable 15 milliGRAM(s) IV Push every 6 hours  pantoprazole  Injectable 40 milliGRAM(s) IV Push daily    MEDICATIONS  (PRN):  HYDROmorphone  Injectable 0.5 milliGRAM(s) IV Push every 6 hours PRN Severe Pain (7 - 10)  HYDROmorphone  Injectable 0.5 milliGRAM(s) IV Push every 15 minutes PRN Severe Pain (7 - 10)  ondansetron Injectable 4 milliGRAM(s) IV Push every 6 hours PRN Nausea      Vital Signs Last 24 Hrs  T(C): 36.9 (2021 05:00), Max: 37.1 (2021 23:08)  T(F): 98.5 (2021 05:00), Max: 98.7 (2021 23:08)  HR: 71 (2021 05:00) (57 - 77)  BP: 144/92 (2021 05:00) (136/70 - 169/81)  BP(mean): 110 (2021 05:00) (96 - 118)  RR: 17 (2021 05:00) (14 - 24)  SpO2: 98% (2021 05:00) (93% - 99%)    PHYSICAL EXAM:      Constitutional: A&Ox3    Respiratory: non labored breathing, no respiratory distress    Cardiovascular: NSR, RRR    Gastrointestinal: Soft, ND, appropriately tender                 Incision: CDI    Genitourinary: voiding     Extremities: (-) edema                  I&O's Detail    2021 07:01  -  2021 07:00  --------------------------------------------------------  IN:    IV PiggyBack: 50 mL    Lactated Ringers: 690 mL    Lactated Ringers: 680 mL    Oral Fluid: 938 mL  Total IN: 2358 mL    OUT:    Voided (mL): 2240 mL  Total OUT: 2240 mL    Total NET: 118 mL          LABS:                        12.1   6.90  )-----------( 328      ( 2021 07:22 )             40.1     04-13    141  |  106  |  4<L>  ----------------------------<  98  3.9   |  25  |  0.74    Ca    9.6      2021 07:22  Phos  2.9     04-13  Mg     1.7     04-13    TPro  7.4  /  Alb  4.0  /  TBili  0.3  /  DBili  x   /  AST  20  /  ALT  12  /  AlkPhos  80  04-12    PT/INR - ( 2021 13:34 )   PT: 12.5 sec;   INR: 1.04          PTT - ( 2021 13:34 )  PTT:33.3 sec  Urinalysis Basic - ( 2021 15:05 )    Color: Yellow / Appearance: Clear / S.010 / pH: x  Gluc: x / Ketone: NEGATIVE  / Bili: Negative / Urobili: 0.2 E.U./dL   Blood: x / Protein: NEGATIVE mg/dL / Nitrite: NEGATIVE   Leuk Esterase: NEGATIVE / RBC: x / WBC x   Sq Epi: x / Non Sq Epi: x / Bacteria: x        RADIOLOGY & ADDITIONAL STUDIES:

## 2021-04-14 NOTE — PROGRESS NOTE ADULT - ASSESSMENT
Patient is a 50 y/o F with PMH of , PSH of breast reconstruction in 1986, laparoscopy in 1991 for possible ruptured ovarian cyst, that presented to Dr. Shah's office today for second opinion, for evaluation of abdominal pain that she describes as periumbilical, LLQ and is colicky and "twisting-like". 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 She had an EGD with biopsy of the cyst that was negative for malignancy, and per patient's wife (ED physician), the EGD showed a duodenal ulcer and completed therapy for H. pylori. The pain has persisted despite the above interventions.  She also reports a 20 lbs loss since the onset of symptoms, and only tolerates liquids. She last had a BM and gas today, and never had a colonoscopy. She was sent to ED for admission and diagnostic laparoscopy tomorrow. She is afebrile and HDS, with non-peritonitic diffuse abdominal pain on exam now s/p diag lap with umbilical hernia repair POD #1    d/c home  Soft diet  PPI   Pain/nausea control PRN   Strict I&Os  IS/OOBA/SCDs/SQH     
Patient is a 52 y/o F with PMH of , PSH of breast reconstruction in 1986, laparoscopy in 1991 for possible ruptured ovarian cyst, that presented to Dr. Shah's office today for second opinion, for evaluation of abdominal pain that she describes as periumbilical, LLQ and is colicky and "twisting-like". 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 She had an EGD with biopsy of the cyst that was negative for malignancy, and per patient's wife (ED physician), the EGD showed a duodenal ulcer and completed therapy for H. pylori. The pain has persisted despite the above interventions.  She also reports a 20 lbs loss since the onset of symptoms, and only tolerates liquids. She last had a BM and gas today, and never had a colonoscopy. She was sent to ED for admission and diagnostic laparoscopy tomorrow. She is afebrile and HDS, with non-peritonitic diffuse abdominal pain on exam.     - NPO for diag lap today  - SQH for DVT ppx  -IVF    
Self

## 2021-05-17 PROBLEM — Z78.9 OTHER SPECIFIED HEALTH STATUS: Chronic | Status: ACTIVE | Noted: 2021-04-12

## 2021-06-04 ENCOUNTER — APPOINTMENT (OUTPATIENT)
Dept: COLORECTAL SURGERY | Facility: CLINIC | Age: 51
End: 2021-06-04
Payer: COMMERCIAL

## 2021-06-04 VITALS
BODY MASS INDEX: 28.35 KG/M2 | HEIGHT: 63 IN | TEMPERATURE: 98.3 F | WEIGHT: 160 LBS | SYSTOLIC BLOOD PRESSURE: 148 MMHG | DIASTOLIC BLOOD PRESSURE: 103 MMHG | HEART RATE: 69 BPM

## 2021-06-04 PROCEDURE — 99072 ADDL SUPL MATRL&STAF TM PHE: CPT

## 2021-06-04 PROCEDURE — 99213 OFFICE O/P EST LOW 20 MIN: CPT

## 2021-06-04 NOTE — PHYSICAL EXAM
[Abdomen Masses] : No abdominal masses [Abdomen Tenderness] : ~T No ~M abdominal tenderness [No Rash or Lesion] : No rash or lesion [Alert] : alert [Calm] : calm [de-identified] : Incision is well-healed. No hernia or masses.

## 2021-06-04 NOTE — HISTORY OF PRESENT ILLNESS
[FreeTextEntry1] : 50 yo F presents for f/u abdominal hernia\par Last seen in the office on 4/12/21, sent to ED for severe abdominal pain\par \par CXR and abdominal x-ray completed on admission\par - No abnormal bowel dilatation or pneumoperitoneum\par - Lower lumbar spine degenerative changes\par - Left renal stone\par - Clear lung bases.\par \par S/p diagnostic laparoscopy which identified small umbilical hernia at previous lap incision site with evidence of preperitoneal fat incarceration in the hernia, s/p umbilical hernia repair in combination with Dr. Bob Israel 4/13/21\par \par Pt reports abdominal tenderness which is worsened w/ body changes. Admits to some discomfort w/ bending over and has some "spasms"  after sitting in car for 45 minutes. Admits to feeling emotional regarding her past pain, she is thankful her pain "is 100%" better" since surgery. She has sought out therapy for counseling.\par Reports she is afraid of eating due to past hx of pain, however eating small amounts of proteins, vegetables. Admits could improve water intake.\par + constipated and bloating\par c/o hemorrhoid flare in setting of sitting on toilet for long periods of time (3 hrs) while meditating and reading or using bidet. Wife has treated w/ ?HC 2.5% w/ some improvement\par BH: had 3 normal BMs this morning and felt relief w/ bloating\par Not taking stool softeners

## 2021-06-04 NOTE — ASSESSMENT
[FreeTextEntry1] : She has recovered well from her surgery. Advised to continue to increase efforts to improve her bowel habits avoid hemorrhoidal exacerbation. Recommend increased daily fiber and water intake and stool softeners as necessary.\par \par \par Followup as needed.

## 2021-10-14 NOTE — DISCHARGE NOTE PROVIDER - NSDCHC_MEDRECSTATUS_GEN_ALL_CORE
Validate That Anesthesia Is Not 0: No Render In Bullet Format When Appropriate: Yes Path Notes (To The Dermatopathologist): Please check margins if malignant. Biopsy Type: H and E Electrodesiccation Text: The wound bed was treated with electrodesiccation after the biopsy was performed. Consent: Written consent and verbal consent were obtained and risks were reviewed including but not limited to scarring, infection, bleeding, scabbing, incomplete removal and allergy to anesthesia. Wound Care: Petrolatum Curettage Text: The wound bed was treated with curettage after the biopsy was performed. Admission Reconciliation is Completed  Discharge Reconciliation is Completed Anesthesia Type: 0.5% lidocaine with 1:200,000 epinephrine and a 1:10 solution of 8.4% sodium bicarbonate Biopsy Method: 15 blade Depth Of Biopsy: dermis Electrodesiccation And Curettage Text: The wound bed was treated with electrodesiccation and curettage after the biopsy was performed. Anesthesia Volume In Cc (Will Not Render If 0): 1 Cryotherapy Text: The wound bed was treated with cryotherapy after the biopsy was performed. Post-Care Instructions: .\\n                                                                                 (Chart note: No repair was needed.)\\n- POSTOPERATIVE INSTRUCTIONS: We gave detailed verbal and printed instructions including the following:\\n1.  Wash hands thoroughly with soap \\n2.  Cleanse the area with gentle soap and water.  A Q-tip may be used.\\n3.  Pat dry and then apply Vaseline ointment (from a tube, not a jar) using a Q-tip\\n4.  Apply a dressing\\n5.  Cleanse wound 2 times a day until the wound is healed. Detail Level: Detailed Information: Selecting Yes will display possible errors in your note based on the variables you have selected. This validation is only offered as a suggestion for you. PLEASE NOTE THAT THE VALIDATION TEXT WILL BE REMOVED WHEN YOU FINALIZE YOUR NOTE. IF YOU WANT TO FAX A PRELIMINARY NOTE YOU WILL NEED TO TOGGLE THIS TO 'NO' IF YOU DO NOT WANT IT IN YOUR FAXED NOTE. Size Of Lesion In Cm: 0.5 X Size Of Lesion In Cm: 0 Billing Type: Third-Party Bill Silver Nitrate Text: The wound bed was treated with silver nitrate after the biopsy was performed. Type Of Destruction Used: Curettage Hemostasis: Aluminum Chloride Dressing: bandage Notification Instructions: Patient will be notified of biopsy results. However, patient instructed to call the office if not contacted within 3 weeks.

## 2023-03-02 NOTE — DISCHARGE NOTE NURSING/CASE MANAGEMENT/SOCIAL WORK - NSDPLANG ASIS_GEN_ALL_CORE
-Cr has ranged 1-1.9 over last few months   - Appears to be at baseline at this time   - monitor BMP; renally dose meds   - strict Is and Os     No

## 2024-04-22 ENCOUNTER — APPOINTMENT (OUTPATIENT)
Dept: GASTROENTEROLOGY | Facility: CLINIC | Age: 54
End: 2024-04-22
Payer: COMMERCIAL

## 2024-04-22 VITALS
BODY MASS INDEX: 30.65 KG/M2 | HEART RATE: 67 BPM | SYSTOLIC BLOOD PRESSURE: 122 MMHG | OXYGEN SATURATION: 98 % | WEIGHT: 173 LBS | HEIGHT: 63 IN | DIASTOLIC BLOOD PRESSURE: 82 MMHG

## 2024-04-22 DIAGNOSIS — R10.9 UNSPECIFIED ABDOMINAL PAIN: ICD-10-CM

## 2024-04-22 PROCEDURE — G2211 COMPLEX E/M VISIT ADD ON: CPT

## 2024-04-22 PROCEDURE — 99204 OFFICE O/P NEW MOD 45 MIN: CPT

## 2024-04-22 RX ORDER — PANTOPRAZOLE SODIUM 40 MG/1
GRANULE, DELAYED RELEASE ORAL
Refills: 0 | Status: DISCONTINUED | COMMUNITY
End: 2024-04-22

## 2024-04-22 RX ORDER — HYOSCYAMINE SULFATE 0.38 MG/1
TABLET, EXTENDED RELEASE ORAL
Refills: 0 | Status: DISCONTINUED | COMMUNITY
End: 2024-04-22

## 2024-04-22 RX ORDER — PREDNISONE 20 MG/1
20 TABLET ORAL
Refills: 0 | Status: ACTIVE | COMMUNITY

## 2024-04-22 NOTE — PHYSICAL EXAM
[Alert] : alert [Sclera] : the sclera and conjunctiva were normal [Hearing Threshold Finger Rub Not Noble] : hearing was normal [No Respiratory Distress] : no respiratory distress [None] : no edema [No CVA Tenderness] : no CVA  tenderness [Abnormal Walk] : normal gait [Normal Color / Pigmentation] : normal skin color and pigmentation [Cranial Nerves Intact] : cranial nerves 2-12 were intact [Oriented To Time, Place, And Person] : oriented to person, place, and time [de-identified] : generalized tenderness 8

## 2024-04-22 NOTE — HISTORY OF PRESENT ILLNESS
[FreeTextEntry1] : Presents with a c/o generalized abdominal paion since March 2024. Apparently, a CT scan 2 weeks ago revealed a small umbilical hernia. A laparoscopy was performed last week at [facility] " ? terminal ileal inflamation" . Currently with nausea / no emesis  / minmal bowel movements.  Denies melena, BRBPR, unexpected weight loss / hematemesis / diarrhea  States that feels a twisting sensation. Accompanied by partner ( ED MD ) and very tearful..unable to sit still due to pain  Additional history includes umbilical hernia repair in 2022  Laparoscopy in her 20s ..told she has Crohns ( has never been on medications for IBD)  Has never had a colonoscopy

## 2024-04-22 NOTE — ASSESSMENT
[FreeTextEntry1] : Abdominal pain:  Etiolgy unclear. Given persistence and intensity of the pain...patient refeeredd to ED for further evaluation  Pertinent available records reviewed Discussed with Dr. Lozada in ED

## 2024-05-17 ENCOUNTER — APPOINTMENT (OUTPATIENT)
Dept: SURGERY | Facility: CLINIC | Age: 54
End: 2024-05-17

## 2024-08-13 NOTE — ED PROVIDER NOTE - COVID-19 RESULT
NEGATIVE PAST MEDICAL HISTORY:  Chronic back pain     Diabetes mellitus     Diabetic neuropathy     History of anxiety

## 2025-05-28 ENCOUNTER — APPOINTMENT (OUTPATIENT)
Dept: GASTROENTEROLOGY | Facility: CLINIC | Age: 55
End: 2025-05-28
Payer: COMMERCIAL

## 2025-05-28 VITALS
HEIGHT: 72 IN | BODY MASS INDEX: 23.43 KG/M2 | WEIGHT: 173 LBS | OXYGEN SATURATION: 97 % | HEART RATE: 77 BPM | SYSTOLIC BLOOD PRESSURE: 122 MMHG | DIASTOLIC BLOOD PRESSURE: 82 MMHG

## 2025-05-28 DIAGNOSIS — Z12.11 ENCOUNTER FOR SCREENING FOR MALIGNANT NEOPLASM OF COLON: ICD-10-CM

## 2025-05-28 PROCEDURE — 99214 OFFICE O/P EST MOD 30 MIN: CPT

## 2025-05-28 PROCEDURE — G2211 COMPLEX E/M VISIT ADD ON: CPT | Mod: NC

## 2025-06-04 ENCOUNTER — TRANSCRIPTION ENCOUNTER (OUTPATIENT)
Age: 55
End: 2025-06-04

## 2025-07-22 ENCOUNTER — APPOINTMENT (OUTPATIENT)
Dept: GASTROENTEROLOGY | Facility: HOSPITAL | Age: 55
End: 2025-07-22

## 2025-07-22 ENCOUNTER — RESULT REVIEW (OUTPATIENT)
Age: 55
End: 2025-07-22